# Patient Record
(demographics unavailable — no encounter records)

---

## 2025-04-15 NOTE — ASSESSMENT
[FreeTextEntry1] : MATTHEW has phimosis; no abnormalities were noted today. We discussed phimosis and its implications. We then discussed the management options, including monitoring, use of steroids, and circumcision. The risks and benefits and possible complications of each option (including but not limited to reaction to steroids, bleeding, injury, incomplete circumcision and need for additional injury) was discussed and all questions were answered. The decision for in office circumcision was made. We performed the procedure using a Plastibell that needs to fall off spontaneously or in the office if needed. I reiterated the anticipated post-circumcision course and instructions. All questions were answered.

## 2025-04-15 NOTE — CONSULT LETTER
[FreeTextEntry1] : Dear Dr. LAVERNE PORRAS ,  I had the pleasure of consulting on [facility] today.  Below is my note regarding the office visit today.  Thank you so very much for allowing me to participate in MATTHEW's  care.  Please don't hesitate to call me should any questions or issues arise .  Sincerely,   John Paul Craig MD, FACS, [facility] Chief, Pediatric Urology Professor of Urology and Pediatrics [employer]  President, [employer] - [place] Section Past-President, Societies for Pediatric Urology

## 2025-04-15 NOTE — CONSULT LETTER
[FreeTextEntry1] : Dear Dr. LAVERNE PORRAS ,  I had the pleasure of consulting on [employer] today.  Below is my note regarding the office visit today.  Thank you so very much for allowing me to participate in MATTHEW's  care.  Please don't hesitate to call me should any questions or issues arise .  Sincerely,   John Paul Craig MD, FACS, [facility] Chief, Pediatric Urology Professor of Urology and Pediatrics [employer]  President, [employer] - [place] Section Past-President, Societies for Pediatric Urology

## 2025-04-15 NOTE — CONSULT LETTER
[FreeTextEntry1] : Dear Dr. LAVERNE PORRAS ,  I had the pleasure of consulting on [employer] today.  Below is my note regarding the office visit today.  Thank you so very much for allowing me to participate in MATTHEW's  care.  Please don't hesitate to call me should any questions or issues arise .  Sincerely,   John Paul Craig MD, FACS, [facility] Chief, Pediatric Urology Professor of Urology and Pediatrics [employer]  President, [employer] - [place] Section Past-President, Societies for Pediatric Urology

## 2025-04-15 NOTE — CONSULT LETTER
[FreeTextEntry1] : Dear Dr. LAVERNE PORRAS ,  I had the pleasure of consulting on [employer] today.  Below is my note regarding the office visit today.  Thank you so very much for allowing me to participate in MATTHEW's  care.  Please don't hesitate to call me should any questions or issues arise .  Sincerely,   John Paul Craig MD, FACS, [facility] Chief, Pediatric Urology Professor of Urology and Pediatrics [employer]  President, [employer] - [place] Section Past-President, Societies [employer] Pediatric Urology

## 2025-04-15 NOTE — PROCEDURE
[FreeTextEntry1] : Procedure: In-office Circumcision by Plastibell  Consent signed  Circumcision performed with Plastibell 1.3   No bleeding and well tolerated  Checked again for bleeding before family left   Discharge instructions were provided including the need to call if the Plastibell does not fall off by 7 days  All questions answered

## 2025-04-15 NOTE — HISTORY OF PRESENT ILLNESS
[TextBox_4] : MATTHEW is here today for evaluation.  He was born at term after an unassisted conception and uneventful pregnancy and delivery.  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 Making ample wet diapers.  No infections.